# Patient Record
(demographics unavailable — no encounter records)

---

## 2024-11-05 NOTE — HISTORY OF PRESENT ILLNESS
[FreeTextEntry1] : Does not know if she snores. Sleeps alone. Reports daytime somnolence despite getting enough hours of sleep.  Wears a smart watch while she sleeps and notes that some oxygen readings overnight range from 82-99%.   She denies any chest pain, dizziness, lightheadedness, orthopnea or PND.   Other medical issues include 1.  Hyperlipidemia well-controlled with atorvastatin 2.  Diabetes recently beginning metformin. 3.  Hypertension generally well-controlled.

## 2024-11-05 NOTE — PHYSICAL EXAM
[Well Nourished] : well nourished [Well Developed] : well developed [No Acute Distress] : no acute distress [Normal Venous Pressure] : normal venous pressure [Soft] : abdomen soft [Non Tender] : non-tender [Edema ___] : edema [unfilled] [Normal] : alert and oriented, normal memory [de-identified] : Normocephalic/atraumatic [de-identified] : Sternotomy healed well    heart sounds: Regular S1-S2 with resolution of friction rub [de-identified] : Obese

## 2024-11-05 NOTE — ASSESSMENT
[FreeTextEntry1] : ECG: Normal sinus rhythm at 66 with normal axis and intervals with no significant ST-T wave changes  Laboratory data: ------7/7/2022--12/14/22---9/21/23--4/17/24--8/13/24 Chol--194---------133----------145------162-------162 HDL----74---------71-------------83-------87---------79 LDL----99---------50--------------51------60---------69 Trigs--106--------63--------------55------81---------73 R0s----8.7--------6.6-------------7.1-----6.9--------7.2  Right heart catheterization 12/1/2022 Shunt running shows a step up at the IVC/SVC and right atrium and a Qp/Qs 1.67 PAP-55/17, mean 31 PCW--10 RA-- 12  Transesophageal study 1/17/2023: Moderate RV enlargement with preserved function estimated pulmonary pressure 43 mmHg 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  Echocardiogram 10/10/2024 Normal LV size and function Intact closure of the ASD 3.  Mildly enlarged right ventricle with mild hypokinesis but no documented pulm hypertension.  Echo 10/17/23 LVEF 50 -55% Mild = Mod Tr PAS - 36 mm Hg  Echocardiogram 10/25/22: LVEF 55-60% Moderately enlarged RV Right ventricular volume overload Borderline dilated right atrium Mild TR Mild dilated pulmonary artery, PASP 29.6 mmHg, limited TR jet sampling.  Exercise Stress Test 10/25/22: Exercised for 5 minutes, 30 seconds. 6 METS Below average functional capacity EKG negative for ischemia Hypertensive baseline at 142/70 with appropriate BP response (196/68)  Holter Monitor 9/1/22: Baseline sinus rhythm Average HR 74 bpm (max 140, min 59) No VT or SVT  Impression: 61-year-old female s/p ASD closure.4/19/23  1. HTN: Generally well-controlled despite being a bit high today.  2. ASD with closure April 2023.: Preop echocardiogram: RV enlargement and volume overload.      Right heart cath suggests shunt at the level of the RA/SVC and Moderate pulmonary hypertension.      JAILENE--ASD with a fenestrated interatrial septum and a moderate-sized ASD.       Pulmonary artery pressures were again demonstrated to be elevated and right-sided chambers are enlarged.  Postop echocardiogram-- low normal LVEF of 50 to 55% LVEF has now improved back to normal. Sustained mild right ventricular enlargement and generalized hypokinesis. Intact closure of the ASD. PA pressures not documented as elevated.  3.  Obesity with nearly 20 pounds increase in the last year.  BMI now 36.  4.  Preop for hysteroscopy and resection of a large uterine polyp.  Recommendations: 1.  There is no cardiac contraindication to proceeding with the proposed gynecologic surgery.  2.  Hold aspirin for 5 days and continue Toprol-XL 25 mg.  3.  General Medical follow-up with regard to management of the diabetes, hyperlipidemia and blood pressure.  4.  Instructed the patient about the benefits of a diet that restricts portion sizes, increases frequency of meals and consists of  vegetables, (more green and leafy),fruit and nuts, whole grains, lean proteins and limits carbohydrates and meat and dairy fats  The patient was instructed to follow a symptom limited regimen of moderate aerobic exercise for 30 minutes 3 to 4 days a week. A warm up and cool down period are to be added to the regimen and small incremental changes can be made every few weeks. Any new symptoms of exercise related chest pain or dyspnea should be reported.  5.  Clinical follow-up here in 6 months.

## 2024-11-05 NOTE — REASON FOR VISIT
[FreeTextEntry1] : 61 -year-old female here for cardiac follow-ups/p  ASD repair 4/19/23 preop for resection of a large endometrial polyp. Patient's history remarkable for repair of a large fenestrated ASD with a patch.  She has been ambulating without any significant shortness of breath.  c/o rare chest discomfort. Actually feels that her functional capacity has improved from what it was preoperatively. Home blood pressures have been well controlled.  Preoperatively to the AST, patient had presented with palpitations, exertional shortness of breath, recently auscultated murmur and recent onset DM.  A subsequent transthoracic echocardiogram had demonstrated unexplained right-sided chamber enlargement.  Right heart catheterization 12/1/2022 Shunt running shows a step up at the IVC/SVC and right atrium and a Qp/Qs 1.67 PAP-55/17, mean 31  JAILENE 1/17/2023: Demonstrated  a fenestrated ASD.  IAS is thin and mobile  with several shunts amounting to moderate size left to right shunt consistent with right-sided enlargement.

## 2024-11-05 NOTE — REVIEW OF SYSTEMS
[Weight Gain (___ Lbs)] : [unfilled] ~Ulb weight gain [Dyspnea on exertion] : dyspnea during exertion [Joint Pain] : joint pain [Lower Ext Edema] : lower extremity edema [Weight Loss (___ Lbs)] : no recent weight loss [FreeTextEntry2] : Other than as documented here and in the HPI, the thirteen point ROS is negative

## 2024-11-13 NOTE — PLAN
[FreeTextEntry1] : Ms. DONALD FONG is a 61 year female with a PMH of asthma with infrequent inhaler use, last crisis Aug/2023 but never hospitalized who comes to the office for preoperative evaluation. Patient denies fever, cough SOB. No other complaints at this time. Patient has greater than 4 METS, is a moderate perioperative risk for Major adverse cardiac event. ECG and blood work reviewed. Cardiac clearance reviewed. No further testing indicated at this time. Patient is medically optimized for this procedure  Prior to appointment and during encounter with patient extensive medical records were reviewed including but not limited to, Hospital records, out patient records, laboratory data and microbiology data    Total encounter total time 30 mins >50% of time spent counseling/coordinating care   Counseling included abnormal lab results, differential diagnoses, treatment options, risks and benefits, lifestyle changes, current condition, medications, and dose adjustments.  The patient was interactive, attentive, asked questions, and verbalized understanding

## 2024-11-13 NOTE — HISTORY OF PRESENT ILLNESS
[No Pertinent Cardiac History] : no history of aortic stenosis, atrial fibrillation, coronary artery disease, recent myocardial infarction, or implantable device/pacemaker [No Pertinent Pulmonary History] : no history of asthma, COPD, sleep apnea, or smoking [No Adverse Anesthesia Reaction] : no adverse anesthesia reaction in self or family member [Chronic Anticoagulation] : no chronic anticoagulation [Chronic Kidney Disease] : no chronic kidney disease [Diabetes] : diabetes [(Patient denies any chest pain, claudication, dyspnea on exertion, orthopnea, palpitations or syncope)] : Patient denies any chest pain, claudication, dyspnea on exertion, orthopnea, palpitations or syncope [Moderate (4-6 METs)] : Moderate (4-6 METs) [FreeTextEntry1] : hysteroscopy and resection of a large uterine polyp [FreeTextEntry2] : 11/20/24 [FreeTextEntry3] : Dr. Toro [FreeTextEntry4] : Ms. KESHAV MARR is a 61 year female with a PMH of HLD, DM, HTN, comes to the office for preoperative evaluation.

## 2024-11-13 NOTE — ASSESSMENT
[Patient Optimized for Surgery] : Patient optimized for surgery [No Further Testing Recommended] : no further testing recommended [Modify anti-platelet treatment prior to procedure] : Modify anti-platelet treatment prior to procedure [As per surgery] : as per surgery [FreeTextEntry4] : Ms. DONALD FONG is a 61 year female with a PMH of asthma with infrequent inhaler use, last crisis Aug/2023 but never hospitalized who comes to the office for preoperative evaluation. Patient denies fever, cough SOB. No other complaints at this time. Patient has greater than 4 METS, is a moderate perioperative risk for Major adverse cardiac event. ECG and blood work reviewed. Cardiac clearance reviewed. No further testing indicated at this time. Patient is medically optimized for this procedure.  [FreeTextEntry6] :  No NSAIDs or Aspirin 5 days prior to procedure

## 2024-12-05 NOTE — HISTORY OF PRESENT ILLNESS
[FreeTextEntry1] : 61-year-old -0-0-3 status post hysteroscopic resection of the endometrial polyp was D&C.  Pathology shows benign polyp and benign endometrial polyps and tissue.  Patient denies bleeding or pain.  Patient is diabetic and trying to control her sugar levels.

## 2024-12-05 NOTE — DISCUSSION/SUMMARY
[FreeTextEntry1] : 61-year-old patient status post hysteroscopic resection of the endometrial polyp.  No pain or bleeding.  Patient to return in 6 months for Pap smear.  All questions answered.

## 2025-02-13 NOTE — PLAN
[FreeTextEntry1] : history of ?enlarged thyroid- will obtain US thyroid and call patient with results.   DM- Patient will continue metformin 500 ER PO QD will test HBA1C today and call patient with results  HTN- patient's BP well controlled with Metoprolol ER 25 mg PO QD. will continue current  regimen   HLD- patient will continue atorvastatin 40 mg PO QD  Regarding patient's obesity - encourage lifestyle modifications - diet and exercise - reduce calorie intake - no soda/ junk food/ snacks - consider mixed grains/ whole grains, leafy vegetables, and an appropriate serving of protein   Prior to appointment and during encounter with patient extensive medical records were reviewed including but not limited to, Hospital records, out patient records, laboratory data and microbiology data    Total encounter total time 30 mins >50% of time spent counseling/coordinating care  Counseling included abnormal lab results, differential diagnoses, treatment options, risks and benefits, lifestyle changes, current condition, medications, and dose adjustments.  The patient was interactive, attentive, asked questions, and verbalized understanding

## 2025-02-13 NOTE — HISTORY OF PRESENT ILLNESS
[FreeTextEntry1] : follow up chronic medical conditions.  [de-identified] : Ms. KESHAV MARR is a 62 year female with a PMH of HLD, HTN, DM comes to the office for follow up of chronic medical conditions and 2 month history of bilateral shoulder pain. Patient denies fever, cough SOB. No other complaints at this time.

## 2025-05-05 NOTE — ASSESSMENT
[FreeTextEntry1] : ECG: Normal sinus rhythm at 68 with some early J-point elevations inferolateral leads (repolarization variant), with no significant ST-T wave changes.    Laboratory data: ------7/7/2022--12/14/22---9/21/23--4/17/24--8/13/24---2/13/25 Chol--194---------133----------145------162-------162------165 HDL----74---------71-------------83-------87---------79-------95 LDL----99---------50--------------51------60---------69--------58 Trigs--106--------63--------------55------81---------73--------57 N1m----1.7--------6.6-------------7.1-----6.9--------7.2--------7.4  Right heart catheterization 12/1/2022 Shunt running shows a step up at the IVC/SVC and right atrium and a Qp/Qs 1.67 PAP-55/17, mean 31 PCW--10 RA-- 12  Transesophageal study 1/17/2023: Moderate RV enlargement with preserved function estimated pulmonary pressure 43 mmHg 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  Echocardiogram 10/10/2024 Normal LV size and function Intact closure of the ASD 3.  Mildly enlarged right ventricle with mild hypokinesis but no documented pulm hypertension.  Echo 10/17/23 LVEF 50 -55% Mild = Mod Tr PAS - 36 mm Hg  Echocardiogram 10/25/22: LVEF 55-60% Moderately enlarged RV Right ventricular volume overload Borderline dilated right atrium Mild TR Mild dilated pulmonary artery, PASP 29.6 mmHg, limited TR jet sampling.  Exercise Stress Test 10/25/22: Exercised for 5 minutes, 30 seconds. 6 METS Below average functional capacity EKG negative for ischemia Hypertensive baseline at 142/70 with appropriate BP response (196/68)  Holter Monitor 9/1/22: Baseline sinus rhythm Average HR 74 bpm (max 140, min 59) No VT or SVT  Impression: 62-year-old female s/p ASD closure.4/19/23  1. HTN: Generally well-controlled despite being a bit high today.  2. ASD with closure April 2023.: Preop echocardiogram: RV enlargement and volume overload.      Right heart cath suggests shunt at the level of the RA/SVC and Moderate pulmonary hypertension.      JAILENE--ASD with a fenestrated interatrial septum and a moderate-sized ASD.       Pulmonary artery pressures were again demonstrated to be elevated and right-sided chambers are enlarged.  Postop echocardiogram-- low normal LVEF of 50 to 55% LVEF has now improved back to normal. Sustained mild right ventricular enlargement and generalized hypokinesis. Intact closure of the ASD. PA pressures not documented as elevated.  3.  Obesity with nearly 20 pounds increase in the last couple of years, although her weight has been relatively stable over the last year.  BMI now 36.  4.  HgA1C slowly increasing despite taking medications for diabetes.    Recommendations: 1.  Recommend discussing diabetes management with PCP.  (?) refer to Endo.   Discuss possibility for modifying medications.   2.  Continue current cardiac medications including Metoprolol, Atorvastatin, ASA.    3.  General Medical follow-up with regard to management of the diabetes, hyperlipidemia and blood pressure.  4.  Instructed the patient about the benefits of a diet that restricts portion sizes, increases frequency of meals and consists of  vegetables, (more green and leafy),fruit and nuts, whole grains, lean proteins and limits carbohydrates and meat and dairy fats  The patient was instructed to follow a symptom limited regimen of moderate aerobic exercise for 30 minutes 3 to 4 days a week. A warm up and cool down period are to be added to the regimen and small incremental changes can be made every few weeks. Any new symptoms of exercise related chest pain or dyspnea should be reported.  5.  Clinical follow-up here with (Dr. Noonan) in 6 months.  Recommend completing a Transthoracic Echocardiogram prior to follow up to reassess extent of ASD repair as well as to evaluate overall cardiac structure and function.

## 2025-05-05 NOTE — REASON FOR VISIT
[FreeTextEntry1] : 62 -year-old female here for cardiac follow-up.   Patient's history remarkable for repair of a large fenestrated ASD with a patch 4/19/23.  She has been ambulating without any significant shortness of breath.  c/o rare chest discomfort similar to her history of GERD. Actually feels that her functional capacity has improved from what it was preoperatively. Home blood pressures have been well controlled.  Preoperatively to the ASD, patient had presented with palpitations, exertional shortness of breath, recently auscultated murmur and recent onset DM.  A subsequent transthoracic echocardiogram had demonstrated unexplained right-sided chamber enlargement.  Right heart catheterization 12/1/2022 Shunt running shows a step up at the IVC/SVC and right atrium and a Qp/Qs 1.67 PAP-55/17, mean 31  JAILENE 1/17/2023: Demonstrated  a fenestrated ASD.  IAS is thin and mobile  with several shunts amounting to moderate size left to right shunt consistent with right-sided enlargement.

## 2025-05-05 NOTE — PHYSICAL EXAM
Sheath #1: Sheath: inserted. Sheath inserted/placed in the right radial  artery. Hemostasis achieved. [Well Developed] : well developed [No Acute Distress] : no acute distress [Normal Venous Pressure] : normal venous pressure [Soft] : abdomen soft [Non Tender] : non-tender [Edema ___] : edema [unfilled] [Normal] : alert and oriented, normal memory [Obese] : obese [de-identified] : Normocephalic/atraumatic [de-identified] : Sternotomy healed well    heart sounds: Regular S1-S2 with resolution of friction rub [de-identified] : Obese

## 2025-05-05 NOTE — ASSESSMENT
[FreeTextEntry1] : ECG: Normal sinus rhythm at 68 with some early J-point elevations inferolateral leads (repolarization variant), with no significant ST-T wave changes.    Laboratory data: ------7/7/2022--12/14/22---9/21/23--4/17/24--8/13/24---2/13/25 Chol--194---------133----------145------162-------162------165 HDL----74---------71-------------83-------87---------79-------95 LDL----99---------50--------------51------60---------69--------58 Trigs--106--------63--------------55------81---------73--------57 Z0m----4.7--------6.6-------------7.1-----6.9--------7.2--------7.4  Right heart catheterization 12/1/2022 Shunt running shows a step up at the IVC/SVC and right atrium and a Qp/Qs 1.67 PAP-55/17, mean 31 PCW--10 RA-- 12  Transesophageal study 1/17/2023: Moderate RV enlargement with preserved function estimated pulmonary pressure 43 mmHg 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  Echocardiogram 10/10/2024 Normal LV size and function Intact closure of the ASD 3.  Mildly enlarged right ventricle with mild hypokinesis but no documented pulm hypertension.  Echo 10/17/23 LVEF 50 -55% Mild = Mod Tr PAS - 36 mm Hg  Echocardiogram 10/25/22: LVEF 55-60% Moderately enlarged RV Right ventricular volume overload Borderline dilated right atrium Mild TR Mild dilated pulmonary artery, PASP 29.6 mmHg, limited TR jet sampling.  Exercise Stress Test 10/25/22: Exercised for 5 minutes, 30 seconds. 6 METS Below average functional capacity EKG negative for ischemia Hypertensive baseline at 142/70 with appropriate BP response (196/68)  Holter Monitor 9/1/22: Baseline sinus rhythm Average HR 74 bpm (max 140, min 59) No VT or SVT  Impression: 62-year-old female s/p ASD closure.4/19/23  1. HTN: Generally well-controlled despite being a bit high today.  2. ASD with closure April 2023.: Preop echocardiogram: RV enlargement and volume overload.      Right heart cath suggests shunt at the level of the RA/SVC and Moderate pulmonary hypertension.      JAILENE--ASD with a fenestrated interatrial septum and a moderate-sized ASD.       Pulmonary artery pressures were again demonstrated to be elevated and right-sided chambers are enlarged.  Postop echocardiogram-- low normal LVEF of 50 to 55% LVEF has now improved back to normal. Sustained mild right ventricular enlargement and generalized hypokinesis. Intact closure of the ASD. PA pressures not documented as elevated.  3.  Obesity with nearly 20 pounds increase in the last couple of years, although her weight has been relatively stable over the last year.  BMI now 36.  4.  HgA1C slowly increasing despite taking medications for diabetes.    Recommendations: 1.  Recommend discussing diabetes management with PCP.  (?) refer to Endo.   Discuss possibility for modifying medications.   2.  Continue current cardiac medications including Metoprolol, Atorvastatin, ASA.    3.  General Medical follow-up with regard to management of the diabetes, hyperlipidemia and blood pressure.  4.  Instructed the patient about the benefits of a diet that restricts portion sizes, increases frequency of meals and consists of  vegetables, (more green and leafy),fruit and nuts, whole grains, lean proteins and limits carbohydrates and meat and dairy fats  The patient was instructed to follow a symptom limited regimen of moderate aerobic exercise for 30 minutes 3 to 4 days a week. A warm up and cool down period are to be added to the regimen and small incremental changes can be made every few weeks. Any new symptoms of exercise related chest pain or dyspnea should be reported.  5.  Clinical follow-up here with (Dr. Noonan) in 6 months.  Recommend completing a Transthoracic Echocardiogram prior to follow up to reassess extent of ASD repair as well as to evaluate overall cardiac structure and function.

## 2025-05-05 NOTE — PHYSICAL EXAM
[Well Developed] : well developed [No Acute Distress] : no acute distress [Normal Venous Pressure] : normal venous pressure [Soft] : abdomen soft [Non Tender] : non-tender [Edema ___] : edema [unfilled] [Normal] : alert and oriented, normal memory [Obese] : obese [de-identified] : Normocephalic/atraumatic [de-identified] : Sternotomy healed well    heart sounds: Regular S1-S2 with resolution of friction rub [de-identified] : Obese

## 2025-05-05 NOTE — REVIEW OF SYSTEMS
[Weight Gain (___ Lbs)] : [unfilled] ~Ulb weight gain [Dyspnea on exertion] : dyspnea during exertion [Joint Pain] : joint pain [Weight Loss (___ Lbs)] : no recent weight loss [Lower Ext Edema] : no extremity edema [FreeTextEntry2] : Other than as documented here and in the HPI, the thirteen point ROS is negative

## 2025-06-04 NOTE — PLAN
[FreeTextEntry1] : hospital discharge follow up Syncope- patient referred to neurologist Peripheral neuropathy- patient prescribed Gabapentin and  referred to neurologist.    DM- Patient will continue metformin 1000 ER PO QD will test HBA1C today and call patient with results  HTN- patient's BP well controlled with Metoprolol ER 25 mg PO QD. will continue current  regimen  patient will follow with her cardiologist  HLD- patient will continue atorvastatin 40 mg PO QD  Regarding patient's obesity - encourage lifestyle modifications - diet and exercise - reduce calorie intake - no soda/ junk food/ snacks - consider mixed grains/ whole grains, leafy vegetables, and an appropriate serving of protein   Prior to appointment and during encounter with patient extensive medical records were reviewed including but not limited to, Hospital records, out patient records, laboratory data and microbiology data    Total encounter total time 30 mins >50% of time spent counseling/coordinating care  Counseling included abnormal lab results, differential diagnoses, treatment options, risks and benefits, lifestyle changes, current condition, medications, and dose adjustments.  The patient was interactive, attentive, asked questions, and verbalized understanding

## 2025-06-04 NOTE — HISTORY OF PRESENT ILLNESS
[FreeTextEntry1] : hospital discharge follow up [de-identified] : Ms. KESHAV MARR is a 62 year female with a PMH of HLD, HTN, DM comes to the office for follow up of chronic medical conditions and hospital discharge follow up.  On 05/25/25 patient went to [facility] ER with syncopal episode. Patient noted she was having dinner around 630 which she finished started feeling dizzy and blacked out. Patient found herself on the ground patient complaining of left- arm chest abdominal pain. Patient called her granddaughter who brought her to the emergency department. Troponin negative, ECG unremarkable D-Dimer positive CT chest angio chest negative for PE, CT abdomen with contrast unremarkable, CT head and neck unremarkable. Patient was discharged home same day.  In office today Patient still complains of "burning" on both feet.

## 2025-06-04 NOTE — HEALTH RISK ASSESSMENT
[No] : In the past 12 months have you used drugs other than those required for medical reasons? No [0] : 2) Feeling down, depressed, or hopeless: Not at all (0) [PHQ-2 Negative - No further assessment needed] : PHQ-2 Negative - No further assessment needed [[id number]] : 0 [Never] : Never

## 2025-07-21 NOTE — COUNSELING
[Nutrition/ Exercise/ Weight Management] : nutrition, exercise, weight management [Bladder Hygiene] : bladder hygiene [Lab Results] : lab results [Medication Management] : medication management

## 2025-07-21 NOTE — DISCUSSION/SUMMARY
[FreeTextEntry1] : Lower back pain --area of concern is lumbar/sacral and radiates towards sides when bending or standing --denies dysuria --UA today negative  --advised ibuprofen prn --must see PCP/ortho for imaging  Bacterial Vaginosis --discharge and symptoms c/w BV --Affirm done --Clindamycin cream QHS for 7 nights --Vag care, diet and hygiene discussed  Follow up for annual in 2 months

## 2025-07-21 NOTE — PHYSICAL EXAM
[FreeTextEntry2] : Odette [Appropriately responsive] : appropriately responsive [Soft] : soft [Labia Majora] : normal [Labia Minora] : normal [Cystocele] : a cystocele [Uterine Prolapse] : uterine prolapse [Discharge] : a  ~M vaginal discharge was present [Scant] : scant [Susan] : yellow [Thin] : thin [Normal] : normal [Uterine Adnexae] : normal

## 2025-07-21 NOTE — HISTORY OF PRESENT ILLNESS
[FreeTextEntry1] : 61 yo  presents with complaints of back pain for past 2 weeks.  Has history of chronic back pain and used to take muscle relaxants prescribed by PCP.  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 [No] : Patient does not have concerns regarding sex [Previously active] : previously active [Men] : men